# Patient Record
Sex: FEMALE | URBAN - METROPOLITAN AREA
[De-identification: names, ages, dates, MRNs, and addresses within clinical notes are randomized per-mention and may not be internally consistent; named-entity substitution may affect disease eponyms.]

---

## 2021-08-28 ENCOUNTER — HISTORICAL (OUTPATIENT)
Dept: INFECTIOUS DISEASES | Facility: HOSPITAL | Age: 21
End: 2021-08-28

## 2025-01-23 ENCOUNTER — HOSPITAL ENCOUNTER (EMERGENCY)
Facility: HOSPITAL | Age: 25
Discharge: HOME | End: 2025-01-23
Attending: FAMILY MEDICINE
Payer: MEDICAID

## 2025-01-23 VITALS
HEART RATE: 74 BPM | RESPIRATION RATE: 18 BRPM | HEIGHT: 62 IN | WEIGHT: 210 LBS | BODY MASS INDEX: 38.64 KG/M2 | SYSTOLIC BLOOD PRESSURE: 117 MMHG | OXYGEN SATURATION: 99 % | DIASTOLIC BLOOD PRESSURE: 67 MMHG | TEMPERATURE: 99 F

## 2025-01-23 DIAGNOSIS — K52.9 ACUTE GASTROENTERITIS: Primary | ICD-10-CM

## 2025-01-23 DIAGNOSIS — R11.2 NAUSEA AND VOMITING, UNSPECIFIED VOMITING TYPE: ICD-10-CM

## 2025-01-23 LAB
B-HCG UR QL: NEGATIVE
BACTERIA #/AREA URNS AUTO: ABNORMAL /HPF
BILIRUB UR QL STRIP.AUTO: NEGATIVE
CLARITY UR: CLEAR
COLOR UR AUTO: ABNORMAL
CTP QC/QA: YES
GLUCOSE UR QL STRIP: NORMAL
HGB UR QL STRIP: NEGATIVE
HYALINE CASTS #/AREA URNS LPF: ABNORMAL /LPF
KETONES UR QL STRIP: NEGATIVE
LEUKOCYTE ESTERASE UR QL STRIP: NEGATIVE
MUCOUS THREADS URNS QL MICRO: ABNORMAL /LPF
NITRITE UR QL STRIP: NEGATIVE
PH UR STRIP: 6 [PH]
PROT UR QL STRIP: NEGATIVE
RBC #/AREA URNS AUTO: ABNORMAL /HPF
SP GR UR STRIP.AUTO: 1.02 (ref 1–1.03)
SQUAMOUS #/AREA URNS LPF: ABNORMAL /HPF
UROBILINOGEN UR STRIP-ACNC: NORMAL
WBC #/AREA URNS AUTO: ABNORMAL /HPF

## 2025-01-23 PROCEDURE — 81025 URINE PREGNANCY TEST: CPT | Performed by: FAMILY MEDICINE

## 2025-01-23 PROCEDURE — 96372 THER/PROPH/DIAG INJ SC/IM: CPT | Performed by: FAMILY MEDICINE

## 2025-01-23 PROCEDURE — 81001 URINALYSIS AUTO W/SCOPE: CPT | Performed by: FAMILY MEDICINE

## 2025-01-23 PROCEDURE — 63600175 PHARM REV CODE 636 W HCPCS: Performed by: FAMILY MEDICINE

## 2025-01-23 PROCEDURE — 99284 EMERGENCY DEPT VISIT MOD MDM: CPT | Mod: 25

## 2025-01-23 PROCEDURE — 25000003 PHARM REV CODE 250: Performed by: FAMILY MEDICINE

## 2025-01-23 RX ORDER — HYOSCYAMINE SULFATE 0.12 MG/1
0.25 TABLET SUBLINGUAL EVERY 6 HOURS PRN
Qty: 30 TABLET | Refills: 0 | Status: SHIPPED | OUTPATIENT
Start: 2025-01-23

## 2025-01-23 RX ORDER — DICYCLOMINE HYDROCHLORIDE 10 MG/ML
20 INJECTION INTRAMUSCULAR
Status: COMPLETED | OUTPATIENT
Start: 2025-01-23 | End: 2025-01-23

## 2025-01-23 RX ORDER — ONDANSETRON 4 MG/1
8 TABLET, ORALLY DISINTEGRATING ORAL 2 TIMES DAILY
Qty: 20 TABLET | Refills: 0 | Status: SHIPPED | OUTPATIENT
Start: 2025-01-23

## 2025-01-23 RX ORDER — ONDANSETRON 4 MG/1
4 TABLET, ORALLY DISINTEGRATING ORAL
Status: COMPLETED | OUTPATIENT
Start: 2025-01-23 | End: 2025-01-23

## 2025-01-23 RX ADMIN — ONDANSETRON 4 MG: 4 TABLET, ORALLY DISINTEGRATING ORAL at 05:01

## 2025-01-23 RX ADMIN — DICYCLOMINE HYDROCHLORIDE 20 MG: 10 INJECTION, SOLUTION INTRAMUSCULAR at 05:01

## 2025-01-23 NOTE — ED PROVIDER NOTES
Encounter Date: 1/23/2025       History     Chief Complaint   Patient presents with    Abdominal Pain     Thinks she has food poisoning. Threw up once at 0200. Sharp pains intermittently on bottom of abdomen. Thinks she is constipated     Patient is a 24-year-old lady who presents to the emergency room and complains of low abdominal pain.  Patient states she was apparently well until last night when she ate some chicken tacos and beans and states shortly after she developed a lower abdominal pain with some associated nausea and vomiting.  Vomitus is said to have been nonbloody but patient states pain seemed to wax and wane ever since and seemed to be in the suprapubic area.  Patient also complains of some associated dysuria but denies any vaginal discharge and denies any diarrhea.  Patient states due to the persistence of pain she decided to come to the emergency room to be evaluated.  There has been no fever    The history is provided by the patient. No  was used.     Review of patient's allergies indicates:  No Known Allergies  History reviewed. No pertinent past medical history.  History reviewed. No pertinent surgical history.  No family history on file.  Social History     Tobacco Use    Smoking status: Never    Smokeless tobacco: Never   Substance Use Topics    Drug use: Never     Review of Systems   Constitutional:  Negative for fever.   HENT:  Negative for sore throat.    Respiratory:  Negative for shortness of breath.    Cardiovascular:  Negative for chest pain.   Gastrointestinal:  Positive for abdominal pain, nausea and vomiting.   Genitourinary:  Negative for dysuria.   Musculoskeletal:  Negative for back pain.   Skin:  Negative for rash.   Neurological:  Negative for weakness.   Hematological:  Does not bruise/bleed easily.       Physical Exam     Initial Vitals [01/23/25 0502]   BP Pulse Resp Temp SpO2   130/74 71 18 98.6 °F (37 °C) 100 %      MAP       --         Physical  Exam    Nursing note and vitals reviewed.  Constitutional: She appears well-developed. She is not diaphoretic. No distress.   Obese, unkempt, afebrile, not pale, anicteric, no cyanosis   HENT:   Head: Normocephalic and atraumatic.   Right Ear: External ear normal.   Left Ear: External ear normal.   Nose: Nose normal. Mouth/Throat: No oropharyngeal exudate.   Dry oral mucosa, vesicular rash noted on upper lips with findings in keeping with herpes labialis   Eyes: Conjunctivae and EOM are normal. Right eye exhibits no discharge. Left eye exhibits no discharge.   Neck: Neck supple. No thyromegaly present.   Normal range of motion.  Cardiovascular:  Normal rate, regular rhythm, normal heart sounds and intact distal pulses.           No murmur heard.  Pulmonary/Chest: Breath sounds normal. No respiratory distress. She has no wheezes. She has no rhonchi. She has no rales.   Abdominal: Abdomen is soft. She exhibits no mass. There is abdominal tenderness (In the suprapubic and left iliac areas). There is no rebound and no guarding.   Musculoskeletal:         General: Normal range of motion.      Cervical back: Normal range of motion and neck supple.     Neurological: She is alert and oriented to person, place, and time.   Skin: Skin is warm and dry.   Vesicular rash noted on upper lips with findings in keeping with herpes labialis     Psychiatric: She has a normal mood and affect. Thought content normal.         ED Course   Procedures  Labs Reviewed   URINALYSIS, REFLEX TO URINE CULTURE - Abnormal       Result Value    Color, UA Light-Yellow      Appearance, UA Clear      Specific Gravity, UA 1.024      pH, UA 6.0      Protein, UA Negative      Glucose, UA Normal      Ketones, UA Negative      Blood, UA Negative      Bilirubin, UA Negative      Urobilinogen, UA Normal      Nitrites, UA Negative      Leukocyte Esterase, UA Negative      RBC, UA 0-5      WBC, UA 0-5      Bacteria, UA None Seen      Squamous Epithelial Cells,  UA Few (*)     Mucous, UA Occasional (*)     Hyaline Casts, UA None Seen     POCT URINE PREGNANCY    POC Preg Test, Ur Negative       Acceptable Yes            Imaging Results    None          Medications   dicyclomine injection 20 mg (20 mg Intramuscular Given 1/23/25 0533)   ondansetron disintegrating tablet 4 mg (4 mg Oral Given 1/23/25 0533)     Medical Decision Making  Patient is given Zofran ODT 4 mg p.o. x1 and Bentyl 20 mg IM x1 which she tolerates.  Urinalysis and urine pregnancy test are ordered and labs returned negative for UTI and pregnancy and these findings are reviewed with the patient and she verbalizes her understanding.  Patient is monitored for a prolonged period of time here in the emergency room and remained stable.  Upon re-evaluation patient states she feels better and she will be discharged home with Zofran ODT 4 mg p.o. t.i.d. p.r.n. and hyoscyamine 0.25 mg p.o. t.i.d. p.r.n..  Patient is advised to increase oral fluid intake and stay on a bland diet and follow up with her primary medical doctor in 2-3 days as needed.  Patient is advised to return to the ER if clinical picture worsens and she verbalizes her understanding.  Patient's condition upon discharge is stable, vitals remained stable, she is able to ambulate independently out of the emergency room.    Amount and/or Complexity of Data Reviewed  Labs: ordered.    Risk  Prescription drug management.                                1. Differential diagnosis:  Food poisoning, bowel obstruction, hiatal hernia, GERD, UTI, ovarian cyst  2. Comorbidities considered that were addressed or put patient at increased risk are reviewed and noted  3. External notes reviewed: As stated in MDM  4. Discussed case and management with patient  5. Independent interpretations of workup like labs  6. Diagnostic therapy is considered, ordered and those not considered. Scores considered  7. Social determinants of health considered (living  situation and conditions, lives far, family siutation, psychiatric limitations, and possible substance abuse etc)      Clinical Impression:  Final diagnoses:  [K52.9] Acute gastroenteritis (Primary)  [R11.2] Nausea and vomiting, unspecified vomiting type       This chart is generated using a voice recognition system.  Grammatical and content areas may inadvertently be generated in error.  Please contact me if you find a perceive any inappropriate information in this chart.  Thank you.   ED Disposition Condition    Discharge Stable          ED Prescriptions       Medication Sig Dispense Start Date End Date Auth. Provider    ondansetron (ZOFRAN-ODT) 4 MG TbDL Take 2 tablets (8 mg total) by mouth 2 (two) times daily. 20 tablet 1/23/2025 -- Braden Ferrera MD    hyoscyamine 0.125 mg Subl Place 2 tablets (0.25 mg total) under the tongue every 6 (six) hours as needed. 30 tablet 1/23/2025 -- Braden Ferrera MD          Follow-up Information       Follow up With Specialties Details Why Contact Info    Your primary care provider  Call in 2 days As needed              Braden Ferrera MD  01/23/25 3450